# Patient Record
Sex: MALE | Race: WHITE | NOT HISPANIC OR LATINO
[De-identification: names, ages, dates, MRNs, and addresses within clinical notes are randomized per-mention and may not be internally consistent; named-entity substitution may affect disease eponyms.]

---

## 2021-06-16 PROBLEM — Z00.00 ENCOUNTER FOR PREVENTIVE HEALTH EXAMINATION: Status: ACTIVE | Noted: 2021-06-16

## 2021-06-22 ENCOUNTER — APPOINTMENT (OUTPATIENT)
Dept: THORACIC SURGERY | Facility: CLINIC | Age: 61
End: 2021-06-22
Payer: COMMERCIAL

## 2021-06-22 VITALS — BODY MASS INDEX: 30.01 KG/M2 | HEIGHT: 70.5 IN | WEIGHT: 212 LBS

## 2021-06-22 DIAGNOSIS — F17.210 NICOTINE DEPENDENCE, CIGARETTES, UNCOMPLICATED: ICD-10-CM

## 2021-06-22 PROCEDURE — G0296 VISIT TO DETERM LDCT ELIG: CPT

## 2021-06-22 PROCEDURE — 99072 ADDL SUPL MATRL&STAF TM PHE: CPT

## 2021-06-22 PROCEDURE — 99406 BEHAV CHNG SMOKING 3-10 MIN: CPT

## 2021-06-22 NOTE — PLAN
[Smoking Cessation] : smoking cessation [FreeTextEntry1] : Plan:\par \par -Low dose CT chest for lung cancer screening. Abigail HADLEY has been CC'd in order for LDCT.\par \par -Follow up with patient and his referring provider after his LDCT results have been reviewed by the multidisciplinary clinical team, if needed.\par \par -Encouraged smoking cessation.\par \par -Patient declines referral to CTC and or CCX\par \par \par Should I screen? tool utilized. 6 Year risk of lung cancer is _4%. Patient wishes to proceed with screening.\par \par Engaged in discussion regarding risks of screening during Covid-19 pandemic and precautions that are being used  to reduce exposure.\par \par Engaged in shared decision making with Mr. BACK . Answered all questions. He verbalized understanding and agreement. He knows to call back with and questions or concerns.\par

## 2021-06-22 NOTE — HISTORY OF PRESENT ILLNESS
[Current] : current smoker [_____ pack-years] : [unfilled] pack-years [TextBox_13] : Referred by Abigail Francisco [facility]\par \par GERRY BACK had telephonic visit for a review of eligibility and discussion of the Low dose CT lung cancer screening program. A telephonic visit occurred due to the patient not having access to a smart phone or a computer for an audio/visual visit.  The following was reviewed and confirmed the patient meets screening eligibility criteria.\par -Age 61 year\par Smoking Status:\par -Current smoker\par Started smoking at 14 years olld. From [age] smoked a few cigarettes. At 16 years old smoked 1 PPD for 3 years, then 2 PPD for 42 years\par -Number of pack years smokin\par \par Mr. BACK   denies any signs or symptoms of lung cancer including new cough, changing cough, hemoptysis, and unintentional weight loss. \par \par Mr. BACK  denies any personal history of lung cancer. Reports no lung cancer in a 1st degree relative. Reports history of Pnuemonia in 2017.  Denies and history of other lung disease. Denies any history of  occupational exposures.\par

## 2021-06-22 NOTE — ASSESSMENT
[Discussed Risks and Advised to Quit Smoking] : Discussed risks and advised to quit smoking [Discussed Cessation Medication] : cessation medication was discussed [Discussed Cessation Strategies] : cessation strategies were discussed [Smoking Cessation Counseling Given (more than 3 min less than10 min) and Resources Provided] : Smoking cessation counseling given (more than 3 min less than 10 min) and resources provided [Not Ready] : Patient is not ready for cessation intervention [Contemplation] : Contemplation: The patient is considering quitting smoking [de-identified] : He reports purchasing NRT patch, but is not ready to quit at this time. He reports when ready he will quit with his wife. Acknowledged how  difficult it is to quit smoking. Advised that quitting smoking is the most important thing a person can do for their health. Discussed strategies to deal with cravings. Suggested exercise as a distraction. Discussed the importance of having a strategy planned in advance of quit date. \par \par 4 minutes spent in smoking cessation counseling.\par

## 2021-07-01 ENCOUNTER — NON-APPOINTMENT (OUTPATIENT)
Age: 61
End: 2021-07-01

## 2021-07-26 ENCOUNTER — RESULT REVIEW (OUTPATIENT)
Age: 61
End: 2021-07-26

## 2022-09-13 ENCOUNTER — NON-APPOINTMENT (OUTPATIENT)
Age: 62
End: 2022-09-13

## 2024-06-01 ENCOUNTER — TRANSCRIPTION ENCOUNTER (OUTPATIENT)
Age: 64
End: 2024-06-01

## 2024-06-03 ENCOUNTER — TRANSCRIPTION ENCOUNTER (OUTPATIENT)
Age: 64
End: 2024-06-03

## 2024-09-12 ENCOUNTER — NON-APPOINTMENT (OUTPATIENT)
Age: 64
End: 2024-09-12

## 2025-06-04 ENCOUNTER — APPOINTMENT (OUTPATIENT)
Dept: PULMONOLOGY | Facility: CLINIC | Age: 65
End: 2025-06-04
Payer: COMMERCIAL

## 2025-06-04 VITALS — WEIGHT: 183 LBS | BODY MASS INDEX: 26.2 KG/M2 | HEIGHT: 70 IN

## 2025-06-04 DIAGNOSIS — Z87.891 PERSONAL HISTORY OF NICOTINE DEPENDENCE: ICD-10-CM

## 2025-06-04 PROCEDURE — G0296 VISIT TO DETERM LDCT ELIG: CPT | Mod: 93

## 2025-06-12 ENCOUNTER — RESULT REVIEW (OUTPATIENT)
Age: 65
End: 2025-06-12

## 2025-06-17 ENCOUNTER — NON-APPOINTMENT (OUTPATIENT)
Age: 65
End: 2025-06-17

## 2025-08-04 ENCOUNTER — APPOINTMENT (OUTPATIENT)
Dept: PULMONOLOGY | Facility: CLINIC | Age: 65
End: 2025-08-04